# Patient Record
Sex: FEMALE | Race: WHITE | NOT HISPANIC OR LATINO | Employment: UNEMPLOYED | URBAN - METROPOLITAN AREA
[De-identification: names, ages, dates, MRNs, and addresses within clinical notes are randomized per-mention and may not be internally consistent; named-entity substitution may affect disease eponyms.]

---

## 2017-01-03 ENCOUNTER — TELEPHONE (OUTPATIENT)
Dept: FAMILY MEDICINE CLINIC | Facility: CLINIC | Age: 22
End: 2017-01-03

## 2017-01-03 NOTE — TELEPHONE ENCOUNTER
----- Message from Mery Rodriguez sent at 1/3/2017 12:42 PM EST -----  Contact: PT # [phone]  SHE NEEDS A BLOOD ORDER DONE... (EDGARD)  AND SHE CALLED LAST WEEK AND LEFT MESSAGE WITH HER AND  HAS NEVER HEARD ANYTHING ON THIS .... CAN YOU PLZ CALL PT  Joana did this last week

## 2017-01-04 ENCOUNTER — LAB (OUTPATIENT)
Dept: FAMILY MEDICINE CLINIC | Facility: CLINIC | Age: 22
End: 2017-01-04

## 2017-01-04 DIAGNOSIS — Z01.84 IMMUNITY STATUS TESTING: ICD-10-CM

## 2017-01-06 LAB — HBV SURFACE AB SER-ACNC: >1000 MIU/ML
